# Patient Record
Sex: FEMALE | Employment: OTHER | URBAN - METROPOLITAN AREA
[De-identification: names, ages, dates, MRNs, and addresses within clinical notes are randomized per-mention and may not be internally consistent; named-entity substitution may affect disease eponyms.]

---

## 2021-04-01 ENCOUNTER — OFFICE VISIT (OUTPATIENT)
Dept: FAMILY MEDICINE | Facility: CLINIC | Age: 41
End: 2021-04-01
Payer: COMMERCIAL

## 2021-04-01 VITALS
DIASTOLIC BLOOD PRESSURE: 81 MMHG | RESPIRATION RATE: 20 BRPM | OXYGEN SATURATION: 100 % | HEART RATE: 96 BPM | WEIGHT: 160 LBS | BODY MASS INDEX: 25.71 KG/M2 | TEMPERATURE: 97.6 F | HEIGHT: 66 IN | SYSTOLIC BLOOD PRESSURE: 124 MMHG

## 2021-04-01 DIAGNOSIS — E04.9 GOITER: Primary | ICD-10-CM

## 2021-04-01 LAB — TSH SERPL DL<=0.005 MIU/L-ACNC: 2.76 MU/L (ref 0.4–4)

## 2021-04-01 PROCEDURE — 86800 THYROGLOBULIN ANTIBODY: CPT | Performed by: FAMILY MEDICINE

## 2021-04-01 PROCEDURE — 86376 MICROSOMAL ANTIBODY EACH: CPT | Performed by: FAMILY MEDICINE

## 2021-04-01 PROCEDURE — 99204 OFFICE O/P NEW MOD 45 MIN: CPT | Performed by: FAMILY MEDICINE

## 2021-04-01 PROCEDURE — 84443 ASSAY THYROID STIM HORMONE: CPT | Performed by: FAMILY MEDICINE

## 2021-04-01 PROCEDURE — 36415 COLL VENOUS BLD VENIPUNCTURE: CPT | Performed by: FAMILY MEDICINE

## 2021-04-01 ASSESSMENT — MIFFLIN-ST. JEOR: SCORE: 1426.48

## 2021-04-01 NOTE — PROGRESS NOTES
"    Assessment & Plan     Goiter      Will do thyroid/thyroiditis labs today and thyroid ultrasound.     Follow up with endocrinology and possibly ENT or core needle biopsy depending on results.     RADIOLOGY:  [facility]:  [phone]   [facility]: [phone]     - TSH with free T4 reflex  - Thyroid peroxidase antibody  - Anti thyroglobulin antibody  - US Thyroid               Tobacco Cessation:   reports that she has been smoking cigarettes. She has been smoking about 0.50 packs per day. She has never used smokeless tobacco.      BMI:   Estimated body mass index is 25.63 kg/m  as calculated from the following:    Height as of this encounter: 1.683 m (5' 6.25\").    Weight as of this encounter: 72.6 kg (160 lb).           Return if symptoms worsen or fail to improve.    Joel Daniel Wegener, MD  [facility]    Howie Boo is a 40 year old who presents for the following health issues     HPI     New Patient/Transfer of Care    Pt reports lump/mass on neck x8 months, no change in size.     No trouble breathing or swallowing.  No sore throat.    No palpitations. No weight loss/weight gain.      Has had finger joint swelling and migratory joint pain.     Otherwise feeling well.         Review of Systems         Objective    /81 (Patient Position: Sitting, Cuff Size: Adult Regular)   Pulse 96   Temp 97.6  F (36.4  C) (Tympanic)   Resp 20   Ht 1.683 m (5' 6.25\")   Wt 72.6 kg (160 lb)   SpO2 100%   BMI 25.63 kg/m    Body mass index is 25.63 kg/m .  Physical Exam   GENERAL: healthy, alert and no distress  EYES: Eyes grossly normal to inspection, PERRL and conjunctivae and sclerae normal  HENT: ear canals and TM's normal, nose and mouth without ulcers or lesions  NECK: 3cm smooth right thyroid mass, mobile.   No neck lymphadenopathy   No thyroid bruit.     Fingers and dorsal hands diffusely swollen. See images for thyroid/finger.   RESP: lungs clear to auscultation " - no rales, rhonchi or wheezes    CV: regular rate and rhythm, normal S1 S2, no S3 or S4, no murmur, click or rub, no peripheral edema and peripheral pulses strong  ABDOMEN: soft, nontender, no hepatosplenomegaly, no masses and bowel sounds normal  MS: no gross musculoskeletal defects noted, no edema.  No pretibial edema.   SKIN: no suspicious lesions or rashes  NEURO: Normal strength and tone, mentation intact and speech normal  PSYCH: mentation appears normal, affect normal/bright

## 2021-04-01 NOTE — PATIENT INSTRUCTIONS
Goiter, finger swelling, joint pain.     Will do thyroid/thyroiditis labs today and thyroid ultrasound.     Follow up with endocrinology and possibly ENT depending on results.     RADIOLOGY:  [facility]:  [phone]   [facility]: [phone]

## 2021-04-04 ENCOUNTER — HEALTH MAINTENANCE LETTER (OUTPATIENT)
Age: 41
End: 2021-04-04

## 2021-04-06 LAB
THYROGLOB AB SERPL IA-ACNC: <20 IU/ML (ref 0–40)
THYROPEROXIDASE AB SERPL-ACNC: <10 IU/ML

## 2021-04-14 ENCOUNTER — ANCILLARY PROCEDURE (OUTPATIENT)
Dept: ULTRASOUND IMAGING | Facility: CLINIC | Age: 41
End: 2021-04-14
Attending: FAMILY MEDICINE
Payer: COMMERCIAL

## 2021-04-14 DIAGNOSIS — E04.9 GOITER: ICD-10-CM

## 2021-04-14 LAB — RADIOLOGIST FLAGS: NORMAL

## 2021-04-14 PROCEDURE — 76536 US EXAM OF HEAD AND NECK: CPT | Performed by: STUDENT IN AN ORGANIZED HEALTH CARE EDUCATION/TRAINING PROGRAM

## 2021-04-16 ENCOUNTER — TELEPHONE (OUTPATIENT)
Dept: FAMILY MEDICINE | Facility: CLINIC | Age: 41
End: 2021-04-16

## 2021-04-16 DIAGNOSIS — E07.89 THYROID MASS OF UNCLEAR ETIOLOGY: Primary | ICD-10-CM

## 2021-04-16 DIAGNOSIS — E04.9 GOITER: ICD-10-CM

## 2021-04-16 NOTE — TELEPHONE ENCOUNTER
My chart results note sent with biopsy/referral information.     Will chart check Monday to see if she has returned message/read.     Joel Wegener,MD

## 2021-04-19 ENCOUNTER — TELEPHONE (OUTPATIENT)
Dept: SURGERY | Facility: CLINIC | Age: 41
End: 2021-04-19

## 2021-04-19 NOTE — TELEPHONE ENCOUNTER
LVM for patient to schedule [facility] New appt with Dr. Pollack next available per referral from Dr. Wegener.    Left call center number for scheduling.

## 2021-04-21 NOTE — TELEPHONE ENCOUNTER
Team please call pt re: thyroid mass.     please see my results letter for thyroid ultrasound (she has not read mychart.)     She can just go ahead and schedule with ENT (see referral) and they can decide on biopsy actually.     It appears that the ENT office has started the process to schedule but it is not clear if they have reached her or not. Please see the ENT scheduling notes.     Joel Wegener,MD

## 2021-04-21 NOTE — TELEPHONE ENCOUNTER
LVM for patient to call back to triage to review US results, also see below message.    Can also log into [employer] to read US result notes from Wegener.    Mehreen Irene RN

## 2021-04-23 ENCOUNTER — TELEPHONE (OUTPATIENT)
Dept: SURGERY | Facility: CLINIC | Age: 41
End: 2021-04-23

## 2021-04-23 NOTE — TELEPHONE ENCOUNTER
[facility] Call Center    Phone Message    May a detailed message be left on voicemail: yes     Reason for Call: Other: Pt scheduled US Biopsy Thyroid for 4/28. Requests call back with results when available to [phone]. Thank you    Action Taken: Message routed to:  [facility] ENT    Travel Screening: Not Applicable

## 2021-04-23 NOTE — TELEPHONE ENCOUNTER
FUTURE VISIT INFORMATION      FUTURE VISIT INFORMATION:    Date: 5/24/2021    Time: 2PM    [place] [facility] [place] INFORMATION:    Referring provider:  Wegener, Joel Daniel Irwin, MD    Referring providers clinic:  [facility]     Reason for visit/diagnosis  Thyroid mass of unclear etiology, Goiter per ref by Dr Wegener, Joel Daniel Irwin, MD in [facility]. OK per Bhargavi kn[facility] Records in [facility]. Biopsy on 4/28. Per Pt    RECORDS REQUESTED FROM:       Clinic name Comments Records Status Imaging Status   [facility]  4/1/2021 note and referral from Wegener, Joel Daniel Irwin, MD Epic    Imaging 4/28/2021 US Biopsy Thyroid  4/14/2021 Us Thyroid  Epic PACS

## 2021-04-24 DIAGNOSIS — Z11.59 ENCOUNTER FOR SCREENING FOR OTHER VIRAL DISEASES: Primary | ICD-10-CM

## 2021-04-24 DIAGNOSIS — Z11.59 ENCOUNTER FOR SCREENING FOR OTHER VIRAL DISEASES: ICD-10-CM

## 2021-04-24 LAB
SARS-COV-2 RNA RESP QL NAA+PROBE: NORMAL
SPECIMEN SOURCE: NORMAL

## 2021-04-24 PROCEDURE — U0005 INFEC AGEN DETEC AMPLI PROBE: HCPCS | Performed by: SPECIALIST

## 2021-04-24 PROCEDURE — U0003 INFECTIOUS AGENT DETECTION BY NUCLEIC ACID (DNA OR RNA); SEVERE ACUTE RESPIRATORY SYNDROME CORONAVIRUS 2 (SARS-COV-2) (CORONAVIRUS DISEASE [COVID-19]), AMPLIFIED PROBE TECHNIQUE, MAKING USE OF HIGH THROUGHPUT TECHNOLOGIES AS DESCRIBED BY CMS-2020-01-R: HCPCS | Performed by: SPECIALIST

## 2021-04-25 LAB
LABORATORY COMMENT REPORT: NORMAL
SARS-COV-2 RNA RESP QL NAA+PROBE: NEGATIVE
SPECIMEN SOURCE: NORMAL

## 2021-04-27 NOTE — TELEPHONE ENCOUNTER
Ema was called back and a message was left for her to contact Dr. Wegener next week for the results of the biopsy as Dr. Pollack will not be around for review- And the results will forward to Dr. Wegener as he ordered the biopsy   Dr. Pollack will discuss results at your appointment with her at the end of May..

## 2021-04-28 DIAGNOSIS — Z11.59 ENCOUNTER FOR SCREENING FOR OTHER VIRAL DISEASES: ICD-10-CM

## 2021-05-03 ENCOUNTER — ANCILLARY PROCEDURE (OUTPATIENT)
Dept: ULTRASOUND IMAGING | Facility: CLINIC | Age: 41
End: 2021-05-03
Attending: FAMILY MEDICINE
Payer: COMMERCIAL

## 2021-05-03 DIAGNOSIS — E04.9 GOITER: ICD-10-CM

## 2021-05-03 DIAGNOSIS — E07.89 THYROID MASS OF UNCLEAR ETIOLOGY: ICD-10-CM

## 2021-05-03 PROCEDURE — 10005 FNA BX W/US GDN 1ST LES: CPT | Performed by: STUDENT IN AN ORGANIZED HEALTH CARE EDUCATION/TRAINING PROGRAM

## 2021-05-03 PROCEDURE — 88173 CYTOPATH EVAL FNA REPORT: CPT | Mod: 26 | Performed by: PATHOLOGY

## 2021-05-03 PROCEDURE — 88172 CYTP DX EVAL FNA 1ST EA SITE: CPT | Mod: 26 | Performed by: PATHOLOGY

## 2021-05-03 RX ORDER — LIDOCAINE HYDROCHLORIDE 10 MG/ML
5 INJECTION, SOLUTION EPIDURAL; INFILTRATION; INTRACAUDAL; PERINEURAL ONCE
Status: COMPLETED | OUTPATIENT
Start: 2021-05-03 | End: 2021-05-03

## 2021-05-03 RX ADMIN — LIDOCAINE HYDROCHLORIDE 1 ML: 10 INJECTION, SOLUTION EPIDURAL; INFILTRATION; INTRACAUDAL; PERINEURAL at 13:02

## 2021-05-04 LAB — COPATH REPORT: NORMAL

## 2021-05-07 NOTE — TELEPHONE ENCOUNTER
[facility] Call Center    Phone Message    May a detailed message be left on voicemail: yes     Reason for Call: Other: Pt requesting call back regarding test results. Says the phone numbers she had for Dr Mcelroy are not in service (confirmed from appt desk #). Wonders if Dr Pollack's team can ask him to reach out to her. Please advise    Action Taken: Message routed to:  [facility] EYE    Travel Screening: Not Applicable

## 2021-05-10 ENCOUNTER — TELEPHONE (OUTPATIENT)
Dept: FAMILY MEDICINE | Facility: CLINIC | Age: 41
End: 2021-05-10

## 2021-05-10 NOTE — TELEPHONE ENCOUNTER
----- Message from Leslie Gotti LPN sent at 5/10/2021 10:40 AM CDT -----  Hello,    We got a call from this patient regarding her recent FNA results. She advised us that she has been calling your office but number seemed of have been changed. She is unsure of this. She may have the wrong number. She is scheduled to see Dr. Pollack 5/24/21. Patient wanting a call from your team to go over recent results.       Thanks!  Leslie

## 2021-05-10 NOTE — TELEPHONE ENCOUNTER
Left message for patient to call [facility] back  When patient calls back please transfer to KAN ANDERS RN

## 2021-05-10 NOTE — TELEPHONE ENCOUNTER
Team- the thyroid biopsies did not show any cancer which is excellent news.     I would recommend she keep the appt with dr. Pollack (ent) to get a final opinion on future plan (I.e. is further testing/removal needed now or only if worsening symptoms develop, etc.)     Joel Wegener,MD

## 2021-05-24 ENCOUNTER — OFFICE VISIT (OUTPATIENT)
Dept: OTOLARYNGOLOGY | Facility: CLINIC | Age: 41
End: 2021-05-24
Payer: COMMERCIAL

## 2021-05-24 ENCOUNTER — PRE VISIT (OUTPATIENT)
Dept: OTOLARYNGOLOGY | Facility: CLINIC | Age: 41
End: 2021-05-24

## 2021-05-24 DIAGNOSIS — E04.1 THYROID NODULE: Primary | ICD-10-CM

## 2021-05-24 PROCEDURE — 99203 OFFICE O/P NEW LOW 30 MIN: CPT | Performed by: SURGERY

## 2021-05-24 ASSESSMENT — PATIENT HEALTH QUESTIONNAIRE - PHQ9: SUM OF ALL RESPONSES TO PHQ QUESTIONS 1-9: 10

## 2021-05-24 ASSESSMENT — PAIN SCALES - GENERAL: PAINLEVEL: NO PAIN (0)

## 2021-05-24 NOTE — LETTER
5/24/2021       RE: Ema Guerrero  [address] [address]     Dear Colleague,    Thank you for referring your patient, Ema Guerrero, to the [facility] [place] [facility]. Please see a copy of my visit note below.    SURGERY CLINIC CONSULTATION    REASON FOR CONSULTATION:  Ema Guerrero was referred by Dr. Wegener for evaluation and discussion of treatment options for thyroid goider     HISTORY OF PRESENT ILLNESS:  Ema Guerrero is a 40 year old female who was noted to have a large right thyroid mass at routine PE. US identified 4.5 cm TR4 right thyroid nodule. TFT's are WNL TPO is WNL.    The patient denies any problems with voice quality, inspiration, or swallowing. No previous h/o PTC. No previous HN XRT.       REVIEW OF SYSTEMS:  ROS EXAM: 10pt ROS pertinent for that noted in HPI  There is no problem list on file for this patient.      No past surgical history on file.    Allergies   Allergen Reactions     Coconut Flavor        Medications reviewed in EMR        Family History   Problem Relation Age of Onset     Heart Disease Mother      Substance Abuse Father      Depression Father      Heart Disease Maternal Grandmother      Asthma Maternal Grandmother      Depression Maternal Grandfather         PHYSICAL EXAM:  There were no vitals taken for this visit.    Neck: on inspection there is a large right thyroid nodule. The trachea is midline. Thyroid moves well with swallowing. Thyroid nodule on right is moderately firm, well circumscribed. No cervical lymphadenopathy.     I personally reviewed the TFT's and thyroid US    ASSESSMENT: right thyroid nodule    PLAN:   I recommend right thyroid lobectomy and isthmusectomy. The procedure and risks including but not limited to bleeding, infection, injury to the RLN, or loss of airway were all discussed with the patient.    She would like to proceed with  surgery.    Denita Pollack

## 2021-05-24 NOTE — PROGRESS NOTES
SURGERY CLINIC CONSULTATION    REASON FOR CONSULTATION:  Ema Guerrero was referred by Dr. Wegener for evaluation and discussion of treatment options for thyroid goider     HISTORY OF PRESENT ILLNESS:  Ema Guerrero is a 40 year old female who was noted to have a large right thyroid mass at routine PE. US identified 4.5 cm TR4 right thyroid nodule. TFT's are WNL TPO is WNL.    The patient denies any problems with voice quality, inspiration, or swallowing. No previous h/o PTC. No previous HN XRT.       REVIEW OF SYSTEMS:  ROS EXAM: 10pt ROS pertinent for that noted in HPI  There is no problem list on file for this patient.      No past surgical history on file.    Allergies   Allergen Reactions     Coconut Flavor        Medications reviewed in EMR        Family History   Problem Relation Age of Onset     Heart Disease Mother      Substance Abuse Father      Depression Father      Heart Disease Maternal Grandmother      Asthma Maternal Grandmother      Depression Maternal Grandfather         PHYSICAL EXAM:  There were no vitals taken for this visit.    Neck: on inspection there is a large right thyroid nodule. The trachea is midline. Thyroid moves well with swallowing. Thyroid nodule on right is moderately firm, well circumscribed. No cervical lymphadenopathy.     I personally reviewed the TFT's and thyroid US    ASSESSMENT: right thyroid nodule    PLAN:   I recommend right thyroid lobectomy and isthmusectomy. The procedure and risks including but not limited to bleeding, infection, injury to the RLN, or loss of airway were all discussed with the patient.    She would like to proceed with surgery.    Denita Pollack

## 2021-05-24 NOTE — NURSING NOTE
Chief Complaint   Patient presents with     Consult     thyroid mass, goiter      Mark Mckeon LPN

## 2021-05-24 NOTE — PATIENT INSTRUCTIONS
1. You were seen in the ENT Clinic today by Dr. Pollack.  If you have any questions or concerns after your appointment, please call   -  ENT Clinic: [phone]      2. Call us if you want to move forward with surgery.    Leslie Bowen LPN  [facility] Otolaryng[facility]  [phone]

## 2021-06-10 ENCOUNTER — PREP FOR PROCEDURE (OUTPATIENT)
Dept: OTOLARYNGOLOGY | Facility: CLINIC | Age: 41
End: 2021-06-10

## 2021-06-10 DIAGNOSIS — E04.1 THYROID NODULE: Primary | ICD-10-CM

## 2021-06-10 RX ORDER — DEXAMETHASONE SODIUM PHOSPHATE 4 MG/ML
10 INJECTION, SOLUTION INTRA-ARTICULAR; INTRALESIONAL; INTRAMUSCULAR; INTRAVENOUS; SOFT TISSUE ONCE
Status: CANCELLED | OUTPATIENT
Start: 2021-06-10 | End: 2021-06-10

## 2021-06-15 ENCOUNTER — TELEPHONE (OUTPATIENT)
Dept: SURGERY | Facility: CLINIC | Age: 41
End: 2021-06-15

## 2021-06-15 NOTE — TELEPHONE ENCOUNTER
Left message regarding scheduling surgery/procedure with Dr. Pollack.   Writer left call back number on the patients voicemail.    Luz Davidson on 6/15/2021 at 1:19 PM   P: [phone]

## 2021-08-17 ENCOUNTER — TELEPHONE (OUTPATIENT)
Dept: SURGERY | Facility: CLINIC | Age: 41
End: 2021-08-17

## 2021-08-17 NOTE — TELEPHONE ENCOUNTER
Called patient to schedule surgery with Dr. Pollack. Patient did not answer, left a message and a call back number of [phone]    Matt Raphael on 8/17/2021 at 12:49 PM

## 2021-09-19 ENCOUNTER — HEALTH MAINTENANCE LETTER (OUTPATIENT)
Age: 41
End: 2021-09-19

## 2021-10-26 ENCOUNTER — TELEPHONE (OUTPATIENT)
Dept: OTOLARYNGOLOGY | Facility: CLINIC | Age: 41
End: 2021-10-26

## 2021-10-26 NOTE — TELEPHONE ENCOUNTER
Called patient and left a voicemail to schedule her procedure. Provided call back number to ENT clinic.

## 2022-04-30 ENCOUNTER — HEALTH MAINTENANCE LETTER (OUTPATIENT)
Age: 42
End: 2022-04-30

## 2022-11-20 ENCOUNTER — HEALTH MAINTENANCE LETTER (OUTPATIENT)
Age: 42
End: 2022-11-20

## 2023-06-02 ENCOUNTER — HEALTH MAINTENANCE LETTER (OUTPATIENT)
Age: 43
End: 2023-06-02

## 2024-02-03 ENCOUNTER — HEALTH MAINTENANCE LETTER (OUTPATIENT)
Age: 44
End: 2024-02-03

## 2024-06-22 ENCOUNTER — HEALTH MAINTENANCE LETTER (OUTPATIENT)
Age: 44
End: 2024-06-22

## (undated) RX ORDER — LIDOCAINE HYDROCHLORIDE 10 MG/ML
INJECTION, SOLUTION EPIDURAL; INFILTRATION; INTRACAUDAL; PERINEURAL
Status: DISPENSED
Start: 2021-05-03